# Patient Record
Sex: FEMALE | Race: OTHER | Employment: FULL TIME | URBAN - METROPOLITAN AREA
[De-identification: names, ages, dates, MRNs, and addresses within clinical notes are randomized per-mention and may not be internally consistent; named-entity substitution may affect disease eponyms.]

---

## 2017-02-12 ENCOUNTER — OFFICE VISIT (OUTPATIENT)
Dept: FAMILY MEDICINE CLINIC | Facility: CLINIC | Age: 37
End: 2017-02-12

## 2017-02-12 DIAGNOSIS — R05.9 COUGH: Primary | ICD-10-CM

## 2017-02-12 PROCEDURE — 99203 OFFICE O/P NEW LOW 30 MIN: CPT

## 2017-02-12 RX ORDER — GUAIFENESIN AND CODEINE PHOSPHATE 100; 10 MG/5ML; MG/5ML
SOLUTION ORAL
Qty: 180 ML | Refills: 0 | Status: SHIPPED | OUTPATIENT
Start: 2017-02-12 | End: 2017-04-13

## 2017-02-13 VITALS
SYSTOLIC BLOOD PRESSURE: 120 MMHG | TEMPERATURE: 98 F | DIASTOLIC BLOOD PRESSURE: 80 MMHG | HEART RATE: 94 BPM | RESPIRATION RATE: 20 BRPM | OXYGEN SATURATION: 98 %

## 2017-02-13 NOTE — PROGRESS NOTES
CHIEF COMPLAINT:   Patient presents with:  Cough/URI: for 3 days    HPI:   Lei Briones is a 29year old female who presents with upper respiratory symptoms for  3  days. Patient reports low grade fever, dry cough.       Current Outpatient Prescriptions: Polo Delacruz is a 29year old female who presents with upper respiratory symptoms that are consistent with    ASSESSMENT:   Cough  (primary encounter diagnosis)    PLAN: Meds as below.   Comfort care as described in Patient Instructions    Meds & Refills

## 2017-02-18 ENCOUNTER — OFFICE VISIT (OUTPATIENT)
Dept: FAMILY MEDICINE CLINIC | Facility: CLINIC | Age: 37
End: 2017-02-18

## 2017-02-18 VITALS
BODY MASS INDEX: 36.8 KG/M2 | SYSTOLIC BLOOD PRESSURE: 120 MMHG | HEART RATE: 92 BPM | OXYGEN SATURATION: 98 % | RESPIRATION RATE: 14 BRPM | WEIGHT: 200 LBS | TEMPERATURE: 98 F | DIASTOLIC BLOOD PRESSURE: 80 MMHG | HEIGHT: 62 IN

## 2017-02-18 DIAGNOSIS — J06.9 VIRAL URI WITH COUGH: Primary | ICD-10-CM

## 2017-02-18 DIAGNOSIS — B09 VIRAL RASH: ICD-10-CM

## 2017-02-18 PROCEDURE — 99213 OFFICE O/P EST LOW 20 MIN: CPT | Performed by: NURSE PRACTITIONER

## 2017-02-18 RX ORDER — BENZONATATE 200 MG/1
200 CAPSULE ORAL 3 TIMES DAILY PRN
Qty: 30 CAPSULE | Refills: 0 | Status: SHIPPED | OUTPATIENT
Start: 2017-02-18 | End: 2017-04-13

## 2017-02-18 NOTE — PROGRESS NOTES
CHIEF COMPLAINT:   Patient presents with:  Cough  Rash      HPI:   Harriet Phelan is a 29year old female who presents for uri symptoms for  9 days. Patient reports congestion, dry cough. Symptoms have been consistent since onset.   Seen here 6 days ago and GENERAL: well developed, well nourished,in no apparent distress  SKIN: no suspicious lesions. Golf ball size area on upper sternum with smooth maculopapular erythematous rash. Non-tender, not puritic, no drainage or warmth.   All other areas clear and wel · Salt water gargles (1 tsp. Salt in 6 oz lukewarm water), use several times daily to help decrease swelling and pain. · Use humidified air, steamy showers/baths and use vaporizer in sleeping quarters to keep secretions thin.     · Saline nasal spray to no · You may use acetaminophen or ibuprofen to control pain and fever, unless another medicine was prescribed.  (Note: If you have chronic liver or kidney disease, have ever had a stomach ulcer or gastrointestinal bleeding, or are taking blood-thinning medicin

## 2017-02-18 NOTE — PATIENT INSTRUCTIONS
· Hydrate! (cold or hot based on comfort). Drink lots of water or other non dehydrating liquids to help with illness. Salty foods, soups and tea can help with throat pain.    · Hand washing-use hand  or wash hands frequently, cover your cough o ibuprofen to control pain and fever, unless another medicine was prescribed.  (Note: If you have chronic liver or kidney disease, have ever had a stomach ulcer or gastrointestinal bleeding, or are taking blood-thinning medicines, talk with your healthcare p

## 2017-03-08 ENCOUNTER — OFFICE VISIT (OUTPATIENT)
Dept: FAMILY MEDICINE CLINIC | Facility: CLINIC | Age: 37
End: 2017-03-08

## 2017-03-08 VITALS
RESPIRATION RATE: 14 BRPM | OXYGEN SATURATION: 98 % | HEART RATE: 72 BPM | TEMPERATURE: 98 F | DIASTOLIC BLOOD PRESSURE: 80 MMHG | SYSTOLIC BLOOD PRESSURE: 120 MMHG

## 2017-03-08 DIAGNOSIS — R05.8 POST-VIRAL COUGH SYNDROME: Primary | ICD-10-CM

## 2017-03-08 PROCEDURE — 99213 OFFICE O/P EST LOW 20 MIN: CPT | Performed by: NURSE PRACTITIONER

## 2017-03-08 RX ORDER — PREDNISONE 20 MG/1
TABLET ORAL
Qty: 10 TABLET | Refills: 0 | Status: SHIPPED | OUTPATIENT
Start: 2017-03-08 | End: 2017-04-13

## 2017-03-09 NOTE — PROGRESS NOTES
CHIEF COMPLAINT:   Patient presents with:  Cough      HPI:   Gerhardt Boast is a 29year old female who presents with cough for  30 days. Started during a URI. Uri symptoms resolved but cough remains,    Patient reports cough Location is in middle throat.  Hunter Reece MUSCULOSKELETAL: patient denies any osseous, soft tissue, or joint complaints. NEURO: Denies headaches, numbness, or change in balane.     EXAM:   /80 mmHg  Pulse 72  Temp(Src) 98.2 °F (36.8 °C) (Oral)  Resp 14  SpO2 98%  LMP 01/25/2017  Breastfeedi Patient instructed to consider dextromethorphan per box instructions at bedtime for cough. Patient instructed to consider OTC guaifenesin per box instructions. Patient confident she is not allergic to guaifenesin.      Patient instructed to consider OTC A cough that is worse at night may be due to postnasal drip. Excess mucus in the nose drains from the back of your nose to your throat. This triggers the cough reflex. Postnasal drip may be due to a sinus infection or allergy.  Common allergens include dust The esophagus is the tube that carries food from the mouth to the stomach. A valve at its lower end prevents stomach acids from flowing upward. If this valve does not work properly, acid from the stomach enters the esophagus.  This may cause a burning pain Airway clearance techniques help to remove mucus from the airways. Clearing the airways helps to improve breathing and lessen the chance of infection. One method is controlled coughing.  Be sure to also use any medicines before or after clearing your airway

## 2017-03-09 NOTE — PATIENT INSTRUCTIONS
Cough, Chronic, Uncertain Cause (Adult)    Everyone has had a cough as part of the common cold, flu, or bronchitis. This kind of cough occurs along with an achy feeling, low-grade fever, nasal and sinus congestion, and a scratchy or sore throat.  This usu Let your healthcare provider know if you are taking any of these. Asthma  Cough may be the only sign of mild asthma. You may have tests to find out if asthma is causing your cough. You may also take asthma medicine on a trial basis.   Acid reflux (heartbur © 3846-0122 The [address] [place] [address], [address] [place]. All rights reserved. This information is not intended as a substitute for professional medical care. Always follow your healthcare professional's instructions.         Airway

## 2017-03-24 ENCOUNTER — HOSPITAL ENCOUNTER (OUTPATIENT)
Age: 37
Discharge: HOME | End: 2017-03-24
Attending: FAMILY MEDICINE
Payer: COMMERCIAL

## 2017-03-24 VITALS
DIASTOLIC BLOOD PRESSURE: 99 MMHG | HEIGHT: 62 IN | OXYGEN SATURATION: 98 % | TEMPERATURE: 98 F | HEART RATE: 114 BPM | WEIGHT: 200 LBS | RESPIRATION RATE: 18 BRPM | SYSTOLIC BLOOD PRESSURE: 141 MMHG | BODY MASS INDEX: 36.8 KG/M2

## 2017-03-24 DIAGNOSIS — J30.9 ALLERGIC RHINITIS, UNSPECIFIED ALLERGIC RHINITIS TRIGGER, UNSPECIFIED RHINITIS SEASONALITY: Primary | ICD-10-CM

## 2017-03-24 PROCEDURE — 99214 OFFICE O/P EST MOD 30 MIN: CPT

## 2017-03-24 PROCEDURE — 99213 OFFICE O/P EST LOW 20 MIN: CPT

## 2017-03-24 RX ORDER — PREDNISONE 20 MG/1
40 TABLET ORAL DAILY
Qty: 10 TABLET | Refills: 0 | Status: SHIPPED | OUTPATIENT
Start: 2017-03-24 | End: 2017-03-29

## 2017-03-24 RX ORDER — FLUTICASONE PROPIONATE 50 MCG
2 SPRAY, SUSPENSION (ML) NASAL DAILY
Qty: 16 G | Refills: 0 | Status: SHIPPED | OUTPATIENT
Start: 2017-03-24 | End: 2017-04-23

## 2017-03-24 NOTE — ED PROVIDER NOTES
Patient Seen in: [facility] In [address] [place] [address]    History   Patient presents with:  Cough/URI    Stated Complaint: Cough    HPI    Patient comes with a several week h/o nasal congestion and cough. She has not had any fevers.  She has been t Constitutional: She is oriented to person, place, and time. She appears well-developed and well-nourished. HENT:   Head: Normocephalic.    Right Ear: External ear normal.   Left Ear: External ear normal.   Mouth/Throat: Oropharynx is clear and moist.

## 2017-04-13 ENCOUNTER — OFFICE VISIT (OUTPATIENT)
Dept: FAMILY MEDICINE CLINIC | Facility: CLINIC | Age: 37
End: 2017-04-13

## 2017-04-13 VITALS
DIASTOLIC BLOOD PRESSURE: 60 MMHG | HEIGHT: 62 IN | BODY MASS INDEX: 36.99 KG/M2 | TEMPERATURE: 98 F | WEIGHT: 201 LBS | SYSTOLIC BLOOD PRESSURE: 110 MMHG | HEART RATE: 94 BPM

## 2017-04-13 DIAGNOSIS — R05.3 CHRONIC COUGH: Primary | ICD-10-CM

## 2017-04-13 DIAGNOSIS — J30.89 OTHER ALLERGIC RHINITIS: ICD-10-CM

## 2017-04-13 DIAGNOSIS — Z23 NEED FOR VACCINATION: ICD-10-CM

## 2017-04-13 PROCEDURE — 90715 TDAP VACCINE 7 YRS/> IM: CPT | Performed by: FAMILY MEDICINE

## 2017-04-13 PROCEDURE — 90471 IMMUNIZATION ADMIN: CPT | Performed by: FAMILY MEDICINE

## 2017-04-13 PROCEDURE — 99203 OFFICE O/P NEW LOW 30 MIN: CPT | Performed by: FAMILY MEDICINE

## 2017-04-13 PROCEDURE — 99212 OFFICE O/P EST SF 10 MIN: CPT | Performed by: FAMILY MEDICINE

## 2017-04-13 RX ORDER — MONTELUKAST SODIUM 10 MG/1
10 TABLET ORAL DAILY
Qty: 30 TABLET | Refills: 6 | Status: SHIPPED | OUTPATIENT
Start: 2017-04-13 | End: 2017-10-10

## 2017-04-13 RX ORDER — FLUTICASONE PROPIONATE 50 MCG
2 SPRAY, SUSPENSION (ML) NASAL DAILY
Qty: 1 BOTTLE | Refills: 6 | Status: SHIPPED | OUTPATIENT
Start: 2017-04-13 | End: 2017-08-11

## 2017-04-13 NOTE — PROGRESS NOTES
Patient ID: Polo Delacruz is a 29year old female. HPI  Patient presents with:  Cough: since middle of Jan     She has been coughing since the middle of January. The first time this started she was coughing for 2 weeks and lost her voice.   She did not Hfa [Albuter*    Unknown    Comment:Headache   PHYSICAL EXAM:   Physical Exam  Blood pressure 145/93, pulse 94, temperature 97.7 °F (36.5 °C), temperature source Oral, height 5' 2\" (1.575 m), weight 201 lb (91.173 kg), last menstrual period 04/04/2017, no rhinitis  -     Montelukast Sodium (SINGULAIR) 10 MG Oral Tab;  Take 1 tablet (10 mg total) by mouth daily.  -     Fluticasone Propionate 50 MCG/ACT Nasal Suspension; 2 sprays by Nasal route daily.  -     Allergy-Kulik    Need for vaccination  -     Odilon Banegas

## 2017-04-13 NOTE — PATIENT INSTRUCTIONS
I showed patient a brochure on [employer] SINUS RINSE and I want patient to do this periodically.   I told patient to make sure to use distilled water for this

## 2017-04-25 ENCOUNTER — NURSE ONLY (OUTPATIENT)
Dept: ALLERGY | Facility: CLINIC | Age: 37
End: 2017-04-25

## 2017-04-25 ENCOUNTER — OFFICE VISIT (OUTPATIENT)
Dept: ALLERGY | Facility: CLINIC | Age: 37
End: 2017-04-25

## 2017-04-25 VITALS
HEART RATE: 95 BPM | HEIGHT: 62 IN | WEIGHT: 202.19 LBS | BODY MASS INDEX: 37.21 KG/M2 | DIASTOLIC BLOOD PRESSURE: 82 MMHG | TEMPERATURE: 98 F | RESPIRATION RATE: 18 BRPM | OXYGEN SATURATION: 97 % | SYSTOLIC BLOOD PRESSURE: 128 MMHG

## 2017-04-25 DIAGNOSIS — J30.9 ALLERGIC RHINITIS, UNSPECIFIED ALLERGIC RHINITIS TRIGGER, UNSPECIFIED RHINITIS SEASONALITY: ICD-10-CM

## 2017-04-25 DIAGNOSIS — R05.3 CHRONIC COUGH: Primary | ICD-10-CM

## 2017-04-25 PROCEDURE — 95004 PERQ TESTS W/ALRGNC XTRCS: CPT | Performed by: ALLERGY & IMMUNOLOGY

## 2017-04-25 PROCEDURE — 95024 IQ TESTS W/ALLERGENIC XTRCS: CPT | Performed by: ALLERGY & IMMUNOLOGY

## 2017-04-25 PROCEDURE — 99244 OFF/OP CNSLTJ NEW/EST MOD 40: CPT | Performed by: ALLERGY & IMMUNOLOGY

## 2017-04-25 PROCEDURE — 94060 EVALUATION OF WHEEZING: CPT | Performed by: ALLERGY & IMMUNOLOGY

## 2017-04-25 PROCEDURE — 99212 OFFICE O/P EST SF 10 MIN: CPT | Performed by: ALLERGY & IMMUNOLOGY

## 2017-04-25 RX ORDER — LEVOCETIRIZINE DIHYDROCHLORIDE 5 MG/1
5 TABLET, FILM COATED ORAL NIGHTLY
Qty: 30 TABLET | Refills: 0 | Status: SHIPPED | OUTPATIENT
Start: 2017-04-25 | End: 2018-10-09

## 2017-04-25 NOTE — PROGRESS NOTES
Warden Mora is a 29year old female. HPI:   Patient presents with:  Cough    Patient is a 67-year-old female who presents for allergy consultation upon referral of their PCP, Dr. Lafaye Dubin.    prior visit with PCP reviewed and appreciated from April 13, Father    • Cancer Father      prostate   • Cancer Mother      cervical       Social History:   Smoking Status: Never Smoker                      Alcohol Use: No                 Medications (Active prior to today's visit):    Current Outpatient Prescriptio to auscultation bilaterally normal respiratory effort   Cardiovascular: regular rate and rhythm no murmurs, gallups, or rubs  Abdomen: soft non-tender non-distended  Skin/Hair: no unusual rashes present  Extremities: no edema, cyanosis, or clubbing  Neurol reviewed  Handouts on allergies and avoidance measures provided and reviewed including the potential treatment option of immunotherapy  Suspect her cough is more so from postnasal drip and from asthma or a primary lung issue    Follow-up in approximately 2

## 2017-05-30 ENCOUNTER — APPOINTMENT (OUTPATIENT)
Dept: CT IMAGING | Facility: HOSPITAL | Age: 37
End: 2017-05-30
Attending: EMERGENCY MEDICINE
Payer: COMMERCIAL

## 2017-05-30 ENCOUNTER — HOSPITAL ENCOUNTER (EMERGENCY)
Facility: HOSPITAL | Age: 37
Discharge: HOME | End: 2017-05-30
Attending: EMERGENCY MEDICINE
Payer: COMMERCIAL

## 2017-05-30 VITALS
WEIGHT: 200.63 LBS | HEART RATE: 90 BPM | OXYGEN SATURATION: 96 % | RESPIRATION RATE: 18 BRPM | SYSTOLIC BLOOD PRESSURE: 126 MMHG | HEIGHT: 62 IN | DIASTOLIC BLOOD PRESSURE: 77 MMHG | BODY MASS INDEX: 36.92 KG/M2

## 2017-05-30 DIAGNOSIS — K63.89 EPIPLOIC APPENDAGITIS: Primary | ICD-10-CM

## 2017-05-30 PROCEDURE — 74177 CT ABD & PELVIS W/CONTRAST: CPT | Performed by: EMERGENCY MEDICINE

## 2017-05-30 PROCEDURE — 96374 THER/PROPH/DIAG INJ IV PUSH: CPT

## 2017-05-30 PROCEDURE — 81025 URINE PREGNANCY TEST: CPT

## 2017-05-30 PROCEDURE — 85025 COMPLETE CBC W/AUTO DIFF WBC: CPT | Performed by: EMERGENCY MEDICINE

## 2017-05-30 PROCEDURE — 99284 EMERGENCY DEPT VISIT MOD MDM: CPT

## 2017-05-30 PROCEDURE — 80048 BASIC METABOLIC PNL TOTAL CA: CPT | Performed by: EMERGENCY MEDICINE

## 2017-05-30 PROCEDURE — 81001 URINALYSIS AUTO W/SCOPE: CPT | Performed by: EMERGENCY MEDICINE

## 2017-05-30 RX ORDER — KETOROLAC TROMETHAMINE 30 MG/ML
30 INJECTION, SOLUTION INTRAMUSCULAR; INTRAVENOUS ONCE
Status: COMPLETED | OUTPATIENT
Start: 2017-05-30 | End: 2017-05-30

## 2017-05-30 NOTE — ED NOTES
Assumed care of patient from triage. Patient to ED from home for right lower quadrant abdominal pain. Patient states that she started having RLQ pain yesterday but that it has not improved. Patient reports two episodes of diarrhea.  Patient states that pain

## 2017-05-30 NOTE — ED PROVIDER NOTES
Patient Seen in: [facility] AND [facility] Emergency Department    History   Patient presents with:  Abdomen/Flank Pain (GI/)    Stated Complaint: abd pain    HPI    The patient is a 28-year-old female who presents with 2 days of right lower quadrant abdominal are normal. Pupils are equal, round, and reactive to light. Neck: Normal range of motion. Neck supple. No JVD present. Cardiovascular: Normal rate, regular rhythm, normal heart sounds and intact distal pulses. No murmur heard.   Pulmonary/Chest: Effo LAVENDER TALL (BNP)       MDM     Pain improved after Toradol      Disposition and Plan     Clinical Impression:  Epiploic appendagitis  (primary encounter diagnosis)    Disposition:  Discharge    Follow-up:  Elvia Hernandez MD  [address]

## 2017-05-30 NOTE — ED INITIAL ASSESSMENT (HPI)
States she started to have rt sided ABD pain last night. No fever. No vomiting. No dysuria. No Vag bleed or discharge. Deniz Francois

## 2017-11-16 RX ORDER — MONTELUKAST SODIUM 10 MG/1
TABLET ORAL
Qty: 90 TABLET | Refills: 0 | Status: SHIPPED | OUTPATIENT
Start: 2017-11-16 | End: 2018-07-21

## 2017-11-16 NOTE — TELEPHONE ENCOUNTER
Chart reviewed. Refills sent per Triage Dept protocol.    Hx of asthma/allergies  Refill Protocol Appointment Criteria  · Appointment scheduled in the past 6 months or in the next 3 months  Recent Outpatient Visits            6 months ago Chronic cough    E

## 2018-12-01 ENCOUNTER — TELEPHONE (OUTPATIENT)
Dept: INTERNAL MEDICINE CLINIC | Facility: CLINIC | Age: 38
End: 2018-12-01

## 2018-12-01 NOTE — TELEPHONE ENCOUNTER
She called today complaing of rash under the breast , spoke with patient , most pasha is funcgal, I send topical cream , if not better  If worsening rash , call us

## 2019-12-31 ENCOUNTER — OFFICE VISIT (OUTPATIENT)
Dept: OBGYN CLINIC | Facility: CLINIC | Age: 39
End: 2019-12-31
Payer: COMMERCIAL

## 2019-12-31 VITALS — BODY MASS INDEX: 26 KG/M2 | SYSTOLIC BLOOD PRESSURE: 120 MMHG | WEIGHT: 144 LBS | DIASTOLIC BLOOD PRESSURE: 78 MMHG

## 2019-12-31 DIAGNOSIS — N97.9 FEMALE INFERTILITY: Primary | ICD-10-CM

## 2019-12-31 DIAGNOSIS — N91.4 SECONDARY OLIGOMENORRHEA: ICD-10-CM

## 2019-12-31 DIAGNOSIS — N92.6 IRREGULAR MENSES: ICD-10-CM

## 2019-12-31 PROCEDURE — 99203 OFFICE O/P NEW LOW 30 MIN: CPT | Performed by: OBSTETRICS & GYNECOLOGY

## 2019-12-31 NOTE — PROGRESS NOTES
HPI:    Patient ID: Naomi Cohn is a 46year old female.     HPI  New patient  Referred by Sourav Doyle  23-year-old  1 para 0-0-1-0 with a last menstrual period of  with a longstanding history of obesity and oligomenorrhea who recent Solution Pen-injector Inject 0.5 mg into the skin once a week. (Patient not taking: Reported on 12/31/2019 ) 1.5 mL 3   • METFORMIN  MG Oral Tab Take 1 tablet (500 mg total) by mouth 2 (two) times daily with meals.  (Patient not taking: Reported on 1

## 2020-01-02 ENCOUNTER — APPOINTMENT (OUTPATIENT)
Dept: LAB | Facility: HOSPITAL | Age: 40
End: 2020-01-02
Attending: OBSTETRICS & GYNECOLOGY
Payer: COMMERCIAL

## 2020-01-02 DIAGNOSIS — N97.9 FEMALE INFERTILITY: ICD-10-CM

## 2020-01-02 LAB
ESTRADIOL SERPL-MCNC: 22.9 PG/ML
FSH SERPL-ACNC: 7.3 MIU/ML

## 2020-01-02 PROCEDURE — 83001 ASSAY OF GONADOTROPIN (FSH): CPT

## 2020-01-02 PROCEDURE — 82670 ASSAY OF TOTAL ESTRADIOL: CPT

## 2020-01-02 PROCEDURE — 83520 IMMUNOASSAY QUANT NOS NONAB: CPT

## 2020-01-02 PROCEDURE — 36415 COLL VENOUS BLD VENIPUNCTURE: CPT

## 2020-01-03 LAB — ANTI-MULLERIAN HORMONE: 0.34 NG/ML

## 2020-01-09 ENCOUNTER — OFFICE VISIT (OUTPATIENT)
Dept: OBGYN CLINIC | Facility: CLINIC | Age: 40
End: 2020-01-09
Payer: COMMERCIAL

## 2020-01-09 VITALS — SYSTOLIC BLOOD PRESSURE: 112 MMHG | BODY MASS INDEX: 25 KG/M2 | DIASTOLIC BLOOD PRESSURE: 68 MMHG | WEIGHT: 139 LBS

## 2020-01-09 DIAGNOSIS — N97.9 FEMALE INFERTILITY: ICD-10-CM

## 2020-01-09 DIAGNOSIS — Z01.419 ENCOUNTER FOR WELL WOMAN EXAM WITH ROUTINE GYNECOLOGICAL EXAM: Primary | ICD-10-CM

## 2020-01-09 DIAGNOSIS — Z11.3 SCREENING FOR STD (SEXUALLY TRANSMITTED DISEASE): ICD-10-CM

## 2020-01-09 PROCEDURE — 99395 PREV VISIT EST AGE 18-39: CPT | Performed by: OBSTETRICS & GYNECOLOGY

## 2020-01-09 NOTE — PROGRESS NOTES
HPI:    Patient ID: Cody Bamberger is a 46year old year old female. 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   Patient had a longstanding lifelong history of amenorrhea and oligomenorrhea from anovulation related tablet 3   • Glucose Blood (CONTOUR NEXT TEST) In Vitro Strip E11.8 check glucose twice daily 100 strip 2   • Betamethasone Dipropionate Aug (DIPROLENE) 0.05 % External Ointment To area bid 45 g 1   • Fluticasone Furoate-Vilanterol (BREO ELLIPTA) 200-25 MC Pap test/HPV every 3 years when previous normal/-HPV. Monthly self breast exam.  First mammogram at age 37 unless family history or other. Contraception discussed. Recommend regular exercise and quality diet. Return to clinic in one year or as needed. (90 mg total) by mouth daily. , Disp: 30 tablet, Rfl: 3  Glucose Blood (CONTOUR NEXT TEST) In Vitro Strip, E11.8 check glucose twice daily, Disp: 100 strip, Rfl: 2  Betamethasone Dipropionate Aug (DIPROLENE) 0.05 % External Ointment, To area bid, Disp: 45 g

## 2020-01-10 LAB
C TRACH DNA SPEC QL NAA+PROBE: NEGATIVE
HPV I/H RISK 1 DNA SPEC QL NAA+PROBE: NEGATIVE
N GONORRHOEA DNA SPEC QL NAA+PROBE: NEGATIVE

## 2020-01-13 ENCOUNTER — TELEPHONE (OUTPATIENT)
Dept: OBGYN CLINIC | Facility: CLINIC | Age: 40
End: 2020-01-13

## 2020-01-13 NOTE — TELEPHONE ENCOUNTER
MyChart message sent to pt.    ----- Message from Janet Jimenes MD sent at 1/13/2020  1:35 PM CST -----  Please notify by MyChart or letter of normal Pap test and normal HPV cotesting.

## 2020-05-01 ENCOUNTER — TELEPHONE (OUTPATIENT)
Dept: OBGYN CLINIC | Facility: CLINIC | Age: 40
End: 2020-05-01

## 2020-05-01 DIAGNOSIS — N91.4 SECONDARY OLIGOMENORRHEA: ICD-10-CM

## 2020-05-01 DIAGNOSIS — N92.6 IRREGULAR MENSES: Primary | ICD-10-CM

## 2020-05-01 NOTE — TELEPHONE ENCOUNTER
Pt was under treatment taking hormones  for 3 mos took positive  Pregnancy test wants to know should she stop taking RX

## 2020-05-01 NOTE — TELEPHONE ENCOUNTER
Congratulations on her good news! I recommend:  Labs now:    - quant hcg  - serum progesterone  - TSH, total and free thyroxine and free T3  - Hgb A1C    Office appointment with me next week with early ob ultrasound. Discontinue meds:  Ozempic, Pantoprazole. Check with Dr. Cole Carter for alternatives if needed. Metformin could be considered.

## 2020-05-01 NOTE — TELEPHONE ENCOUNTER
Patient aware per AJB get her blood work done this week if possible, order placed in the system, marifer made on 5/5 for early ob 1 us.  Patient is unsure of her dates, told her based on her hcg levels the ob 1 us might be r/s for later date if its too early, patient understands no further questions at this time

## 2020-05-02 ENCOUNTER — APPOINTMENT (OUTPATIENT)
Dept: LAB | Facility: HOSPITAL | Age: 40
End: 2020-05-02
Attending: INTERNAL MEDICINE
Payer: COMMERCIAL

## 2020-05-02 DIAGNOSIS — E11.9 TYPE 2 DIABETES MELLITUS WITHOUT COMPLICATION, WITHOUT LONG-TERM CURRENT USE OF INSULIN (HCC): ICD-10-CM

## 2020-05-02 DIAGNOSIS — N92.6 IRREGULAR MENSES: ICD-10-CM

## 2020-05-02 DIAGNOSIS — N91.4 SECONDARY OLIGOMENORRHEA: ICD-10-CM

## 2020-05-02 PROCEDURE — 84144 ASSAY OF PROGESTERONE: CPT

## 2020-05-02 PROCEDURE — 84702 CHORIONIC GONADOTROPIN TEST: CPT

## 2020-05-02 PROCEDURE — 84439 ASSAY OF FREE THYROXINE: CPT

## 2020-05-02 PROCEDURE — 84481 FREE ASSAY (FT-3): CPT

## 2020-05-02 PROCEDURE — 83036 HEMOGLOBIN GLYCOSYLATED A1C: CPT

## 2020-05-02 PROCEDURE — 84443 ASSAY THYROID STIM HORMONE: CPT

## 2020-05-02 PROCEDURE — 36415 COLL VENOUS BLD VENIPUNCTURE: CPT

## 2020-05-04 ENCOUNTER — TELEPHONE (OUTPATIENT)
Dept: OBGYN CLINIC | Facility: CLINIC | Age: 40
End: 2020-05-04

## 2020-05-04 NOTE — TELEPHONE ENCOUNTER
Please inform patient that her blood work shows that she is early pregnant. Her quant bhcg level is 345. At this level we would not see anything on ultrasound.   I recommend that she repeat the quant bhcg and serum progesterone one week from the last one

## 2020-05-09 ENCOUNTER — HOSPITAL ENCOUNTER (EMERGENCY)
Facility: HOSPITAL | Age: 40
Discharge: HOME | End: 2020-05-09
Attending: EMERGENCY MEDICINE
Payer: COMMERCIAL

## 2020-05-09 ENCOUNTER — APPOINTMENT (OUTPATIENT)
Dept: ULTRASOUND IMAGING | Facility: HOSPITAL | Age: 40
End: 2020-05-09
Attending: EMERGENCY MEDICINE
Payer: COMMERCIAL

## 2020-05-09 VITALS
DIASTOLIC BLOOD PRESSURE: 89 MMHG | OXYGEN SATURATION: 98 % | RESPIRATION RATE: 19 BRPM | TEMPERATURE: 98 F | SYSTOLIC BLOOD PRESSURE: 126 MMHG | HEART RATE: 92 BPM

## 2020-05-09 DIAGNOSIS — O02.1 MISSED ABORTION: Primary | ICD-10-CM

## 2020-05-09 PROCEDURE — 76817 TRANSVAGINAL US OBSTETRIC: CPT | Performed by: EMERGENCY MEDICINE

## 2020-05-09 PROCEDURE — 86900 BLOOD TYPING SEROLOGIC ABO: CPT | Performed by: EMERGENCY MEDICINE

## 2020-05-09 PROCEDURE — 84702 CHORIONIC GONADOTROPIN TEST: CPT | Performed by: EMERGENCY MEDICINE

## 2020-05-09 PROCEDURE — 80048 BASIC METABOLIC PNL TOTAL CA: CPT | Performed by: EMERGENCY MEDICINE

## 2020-05-09 PROCEDURE — 85025 COMPLETE CBC W/AUTO DIFF WBC: CPT | Performed by: EMERGENCY MEDICINE

## 2020-05-09 PROCEDURE — 76801 OB US < 14 WKS SINGLE FETUS: CPT | Performed by: EMERGENCY MEDICINE

## 2020-05-09 PROCEDURE — 87086 URINE CULTURE/COLONY COUNT: CPT | Performed by: EMERGENCY MEDICINE

## 2020-05-09 PROCEDURE — 81025 URINE PREGNANCY TEST: CPT

## 2020-05-09 PROCEDURE — 86901 BLOOD TYPING SEROLOGIC RH(D): CPT | Performed by: EMERGENCY MEDICINE

## 2020-05-09 PROCEDURE — 36415 COLL VENOUS BLD VENIPUNCTURE: CPT

## 2020-05-09 PROCEDURE — 81001 URINALYSIS AUTO W/SCOPE: CPT | Performed by: EMERGENCY MEDICINE

## 2020-05-09 PROCEDURE — 99284 EMERGENCY DEPT VISIT MOD MDM: CPT

## 2020-05-09 NOTE — ED NOTES
Assumed care of patient from [facility]. Patient is resting comfortably in ED stretcher. Denies pain at this time. She is alert, oriented x4, speech clear, respirations regular and nonlabored. Able to dress self and ambulate unassisted with steady gait.

## 2020-05-09 NOTE — ED INITIAL ASSESSMENT (HPI)
Patient here with vaginal bleeding that began this morning. Patient is 4-6 weeks pregnant. Denies any vaginal pain.

## 2020-05-09 NOTE — PROGRESS NOTES
[facility]    Progress Note    Valeria Bosch Patient Status:  Emergency    1980 MRN [id number]   Location [address] Attending Krista Copeland MD   Hosp Day # 0 PCP Patsy Mendez MD        Subject 12/12/2019    T4F 0.8 05/02/2020    TSH 1.450 05/02/2020       Us Pregnancy Less Than 14 Weeks (transabdominal/transvaginal) (MNO=[id number])    Result Date: 5/9/2020  CONCLUSION:  1. No evidence of intrauterine gestation. 2. Normal appearance of adnexa.

## 2020-05-09 NOTE — ED NOTES
Patient off floor to [facility] [facility]

## 2020-05-09 NOTE — ED PROVIDER NOTES
Patient Seen in: [facility] AND [facility] Emergency Department      History   Patient presents with:  Pregnancy Issues    Stated Complaint: Eval G (6-8 weeks preg?)     HPI    The patient is a 72-year-old female who had a positive home pregnancy test and thinks Effort: Pulmonary effort is normal.      Breath sounds: Normal breath sounds. Abdominal:      General: Bowel sounds are normal. There is no distension. Palpations: Abdomen is soft. There is no mass. Tenderness: There is no tenderness.  There is Final result                 Please view results for these tests on the individual orders.    ABORH (BLOOD TYPE)   RAINBOW DRAW BLUE   RAINBOW DRAW LAVENDER   RAINBOW DRAW LIGHT GREEN   RAINBOW DRAW GOLD   URINE CULTURE, ROUTINE   CBC W/ DIFFERENT

## 2020-05-11 ENCOUNTER — TELEPHONE (OUTPATIENT)
Dept: OBGYN CLINIC | Facility: CLINIC | Age: 40
End: 2020-05-11

## 2020-05-11 NOTE — TELEPHONE ENCOUNTER
Patient has appt tomorrow for ob u/s 1 however she miscarried over the weekend 5/9 so visit should be changed to miscarriage f/u.

## 2020-05-12 ENCOUNTER — OFFICE VISIT (OUTPATIENT)
Dept: OBGYN CLINIC | Facility: CLINIC | Age: 40
End: 2020-05-12
Payer: COMMERCIAL

## 2020-05-12 VITALS — WEIGHT: 142 LBS | DIASTOLIC BLOOD PRESSURE: 70 MMHG | BODY MASS INDEX: 26 KG/M2 | SYSTOLIC BLOOD PRESSURE: 112 MMHG

## 2020-05-12 DIAGNOSIS — O03.9 MISCARRIAGE: Primary | ICD-10-CM

## 2020-05-12 PROCEDURE — 99213 OFFICE O/P EST LOW 20 MIN: CPT | Performed by: OBSTETRICS & GYNECOLOGY

## 2020-05-12 NOTE — PROGRESS NOTES
HPI:    Patient ID: Bianka Ríos is a 46year old female. HPI  Problem visit  Went to emergency room on May 9 with vaginal bleeding having passed moderate size clots. Quantitative beta hCG had dropped to 95.   Patient states that her bleeding sto TEST) In Vitro Strip E11.8 check glucose twice daily 100 strip 2     Allergies:  Cheratussin Ac [Gua*    INSOMNIA  Proair Hfa [Tizanid*    UNKNOWN    Comment:Headache   PHYSICAL EXAM:   Physical Exam  Abdomen soft, nontender. No masses. Pelvic deferred.

## 2020-05-16 ENCOUNTER — APPOINTMENT (OUTPATIENT)
Dept: LAB | Facility: HOSPITAL | Age: 40
End: 2020-05-16
Attending: OBSTETRICS & GYNECOLOGY
Payer: COMMERCIAL

## 2020-05-16 DIAGNOSIS — O03.9 MISCARRIAGE: ICD-10-CM

## 2020-05-16 PROCEDURE — 84702 CHORIONIC GONADOTROPIN TEST: CPT

## 2020-05-16 PROCEDURE — 36415 COLL VENOUS BLD VENIPUNCTURE: CPT

## 2020-05-18 ENCOUNTER — TELEPHONE (OUTPATIENT)
Dept: OBGYN CLINIC | Facility: CLINIC | Age: 40
End: 2020-05-18

## 2020-05-18 NOTE — TELEPHONE ENCOUNTER
Patient aware needs to get her quants doen every 3-4 days, marifer made in 1week to follow up with JAZZ

## 2020-05-18 NOTE — TELEPHONE ENCOUNTER
Spoke with patient and informed of quantitative beta-hCG level that jm. Will need to follow more closely, twice weekly. Please order serial quantitative beta-hCG x3. Patient to schedule office appointment for next week. Counseled on the possibility of ectopic pregnancy versus unresolved intrauterine miscarriage. Counseled on symptoms of ruptured ectopic pregnancy symptoms to go to the emergency room.

## 2020-05-19 ENCOUNTER — APPOINTMENT (OUTPATIENT)
Dept: LAB | Facility: HOSPITAL | Age: 40
End: 2020-05-19
Attending: OBSTETRICS & GYNECOLOGY
Payer: COMMERCIAL

## 2020-05-19 ENCOUNTER — TELEPHONE (OUTPATIENT)
Dept: OBGYN CLINIC | Facility: CLINIC | Age: 40
End: 2020-05-19

## 2020-05-19 DIAGNOSIS — N92.6 MISSED MENSES: Primary | ICD-10-CM

## 2020-05-19 DIAGNOSIS — O03.9 MISCARRIAGE: ICD-10-CM

## 2020-05-19 PROCEDURE — 84702 CHORIONIC GONADOTROPIN TEST: CPT

## 2020-05-19 PROCEDURE — 36415 COLL VENOUS BLD VENIPUNCTURE: CPT

## 2020-05-19 NOTE — TELEPHONE ENCOUNTER
US called back and they have scheduled pt for tomorrow at 12pm.     Pt needs to park at [address] and will need to make a left from [address] and go to [place]. Needs to drink about 24 oz 1hr before and not use restroom.  US informed to call Dr. Giovanna Guardado

## 2020-05-19 NOTE — TELEPHONE ENCOUNTER
I spoke with patient about her lab qbhcg showing an inadequately rising quantitative level. Counseled that it is abnormal and may be an ectopic pregnancy or intrauterine failed pregnancy. Patient does not have any pain or bleeding.   Has occasional left

## 2020-05-19 NOTE — TELEPHONE ENCOUNTER
LMTCB for US hold and call to call office back and help us schedule apt for pt tomorrow. Per AJB please provide his number for the hold and call as he would like to be notified of results and would like to speak to pt about results once performed.

## 2020-05-20 ENCOUNTER — HOSPITAL ENCOUNTER (OUTPATIENT)
Dept: ULTRASOUND IMAGING | Facility: HOSPITAL | Age: 40
Discharge: HOME | End: 2020-05-20
Attending: OBSTETRICS & GYNECOLOGY
Payer: COMMERCIAL

## 2020-05-20 ENCOUNTER — TELEPHONE (OUTPATIENT)
Dept: OBGYN CLINIC | Facility: CLINIC | Age: 40
End: 2020-05-20

## 2020-05-20 DIAGNOSIS — N92.6 MISSED MENSES: ICD-10-CM

## 2020-05-20 DIAGNOSIS — N92.6 MISSED MENSES: Primary | ICD-10-CM

## 2020-05-20 PROCEDURE — 76817 TRANSVAGINAL US OBSTETRIC: CPT | Performed by: OBSTETRICS & GYNECOLOGY

## 2020-05-20 PROCEDURE — 76801 OB US < 14 WKS SINGLE FETUS: CPT | Performed by: OBSTETRICS & GYNECOLOGY

## 2020-05-20 NOTE — TELEPHONE ENCOUNTER
I spoke to pt and informed of normal pelvic ultrasound result. Patient still feeling fine without pain or sx. Counseled on plan and wishes to continue expectancy with serial f/u of qbhcg 2x/wk. May spontaneously resolve.   If not, informed of poss MTX fo

## 2020-05-22 ENCOUNTER — TELEPHONE (OUTPATIENT)
Dept: OBGYN CLINIC | Facility: CLINIC | Age: 40
End: 2020-05-22

## 2020-05-22 ENCOUNTER — APPOINTMENT (OUTPATIENT)
Dept: LAB | Facility: HOSPITAL | Age: 40
End: 2020-05-22
Attending: OBSTETRICS & GYNECOLOGY
Payer: COMMERCIAL

## 2020-05-22 ENCOUNTER — HOSPITAL ENCOUNTER (EMERGENCY)
Facility: HOSPITAL | Age: 40
Discharge: HOME | End: 2020-05-22
Attending: EMERGENCY MEDICINE
Payer: COMMERCIAL

## 2020-05-22 VITALS
RESPIRATION RATE: 16 BRPM | HEIGHT: 62 IN | WEIGHT: 139.31 LBS | SYSTOLIC BLOOD PRESSURE: 133 MMHG | BODY MASS INDEX: 25.64 KG/M2 | TEMPERATURE: 99 F | DIASTOLIC BLOOD PRESSURE: 88 MMHG | HEART RATE: 80 BPM | OXYGEN SATURATION: 99 %

## 2020-05-22 DIAGNOSIS — N92.6 MISSED MENSES: ICD-10-CM

## 2020-05-22 DIAGNOSIS — O00.90 ECTOPIC PREGNANCY WITHOUT INTRAUTERINE PREGNANCY, UNSPECIFIED LOCATION: Primary | ICD-10-CM

## 2020-05-22 PROCEDURE — 96372 THER/PROPH/DIAG INJ SC/IM: CPT

## 2020-05-22 PROCEDURE — 99283 EMERGENCY DEPT VISIT LOW MDM: CPT

## 2020-05-22 PROCEDURE — 36415 COLL VENOUS BLD VENIPUNCTURE: CPT

## 2020-05-22 PROCEDURE — 84702 CHORIONIC GONADOTROPIN TEST: CPT

## 2020-05-22 NOTE — TELEPHONE ENCOUNTER
Spoke with patient to inform of abnormal rise of qbhcg indicating a nonviable pregnancy and likely ectopic pregnancy. Will Rx Methotrexate treatment. Counseled on risks and benefits and elects to proceed. Will be administered in ER.

## 2020-05-22 NOTE — TELEPHONE ENCOUNTER
ER notified of patient coming in for MTX Rx of ectopic pregnancy.   On call OB Dr. Ruddy Morin informed as well

## 2020-05-22 NOTE — ED INITIAL ASSESSMENT (HPI)
Pt to the ed sent to be given methotrexate d/t miscarriage on May 9th. Pt states Dr. Surekha Hartman called her in.

## 2020-05-23 NOTE — ED NOTES
Patient discharged home in no acute distress. She reports no physical complaints after Methotrexate injection. A&Ox4, skin p/w/d, denies cp/sob. Ambulating with steady gait and verbalized understanding of d/c instructions .

## 2020-05-23 NOTE — ED PROVIDER NOTES
Patient Seen in: [facility] AND [facility] Emergency Department      History   Patient presents with: Other    Stated Complaint: Patient needs Methotrexate for ectopic preg    HPI    45 yo female sent by OB for methotrexate.  She has had two ultrasounds without There is no tenderness. Skin:     General: Skin is warm and dry. Capillary Refill: Capillary refill takes less than 2 seconds. Neurological:      General: No focal deficit present. Mental Status: She is alert.       Sensory: No sensory deficit

## 2020-05-23 NOTE — ED NOTES
Received pt from triage. Pt here after having miscarriage on May 9th. Pt states she has been getting weekly Beta HcG test to watch levels and her levels have been going up.  Pt has last US on Wednesday that was normal. Pt denies any pain at this time, state

## 2020-05-26 ENCOUNTER — OFFICE VISIT (OUTPATIENT)
Dept: OBGYN CLINIC | Facility: CLINIC | Age: 40
End: 2020-05-26
Payer: COMMERCIAL

## 2020-05-26 ENCOUNTER — LAB ENCOUNTER (OUTPATIENT)
Dept: LAB | Facility: HOSPITAL | Age: 40
End: 2020-05-26
Attending: OBSTETRICS & GYNECOLOGY
Payer: COMMERCIAL

## 2020-05-26 VITALS — BODY MASS INDEX: 25 KG/M2 | WEIGHT: 139 LBS | DIASTOLIC BLOOD PRESSURE: 86 MMHG | SYSTOLIC BLOOD PRESSURE: 134 MMHG

## 2020-05-26 DIAGNOSIS — O00.90 ECTOPIC PREGNANCY WITHOUT INTRAUTERINE PREGNANCY, UNSPECIFIED LOCATION: Primary | ICD-10-CM

## 2020-05-26 PROCEDURE — 36415 COLL VENOUS BLD VENIPUNCTURE: CPT

## 2020-05-26 PROCEDURE — 99213 OFFICE O/P EST LOW 20 MIN: CPT | Performed by: OBSTETRICS & GYNECOLOGY

## 2020-05-26 PROCEDURE — 84702 CHORIONIC GONADOTROPIN TEST: CPT

## 2020-05-27 ENCOUNTER — TELEPHONE (OUTPATIENT)
Dept: OBGYN CLINIC | Facility: CLINIC | Age: 40
End: 2020-05-27

## 2020-05-27 PROBLEM — O00.90 ECTOPIC PREGNANCY WITHOUT INTRAUTERINE PREGNANCY: Status: ACTIVE | Noted: 2020-05-27

## 2020-05-27 NOTE — TELEPHONE ENCOUNTER
Patient being treated with Methotrexate for ectopic pregnancy. Please inform that quant bhcg is no longer rising, a good result. Should go for her Day 7 labs Friday.

## 2020-05-27 NOTE — PROGRESS NOTES
HPI:    Patient ID: Leatha Clay is a 46year old female. HPI  Problem visit  Patient was treated with methotrexate for suspected ectopic pregnancy. Had abnormal rise of quantitative beta-hCG.   No evidence for intrauterine or extrauterine pregna the skin once a week.  (Patient not taking: Reported on 5/26/2020 ) 1.5 mL 1   • Glucose Blood (CONTOUR NEXT TEST) In Vitro Strip E11.8 check glucose twice daily 100 strip 2   • Betamethasone Dipropionate Aug (DIPROLENE) 0.05 % External Ointment To area bid

## 2020-05-29 ENCOUNTER — TELEPHONE (OUTPATIENT)
Dept: OBGYN CLINIC | Facility: CLINIC | Age: 40
End: 2020-05-29

## 2020-05-29 ENCOUNTER — LAB ENCOUNTER (OUTPATIENT)
Dept: LAB | Facility: HOSPITAL | Age: 40
End: 2020-05-29
Attending: OBSTETRICS & GYNECOLOGY
Payer: COMMERCIAL

## 2020-05-29 DIAGNOSIS — Z87.59 HISTORY OF ECTOPIC PREGNANCY: Primary | ICD-10-CM

## 2020-05-29 DIAGNOSIS — N92.6 MISSED MENSES: ICD-10-CM

## 2020-05-29 DIAGNOSIS — O00.90 ECTOPIC PREGNANCY WITHOUT INTRAUTERINE PREGNANCY, UNSPECIFIED LOCATION: ICD-10-CM

## 2020-05-29 PROCEDURE — 85025 COMPLETE CBC W/AUTO DIFF WBC: CPT

## 2020-05-29 PROCEDURE — 36415 COLL VENOUS BLD VENIPUNCTURE: CPT

## 2020-05-29 PROCEDURE — 80053 COMPREHEN METABOLIC PANEL: CPT

## 2020-05-29 PROCEDURE — 84702 CHORIONIC GONADOTROPIN TEST: CPT

## 2020-05-29 NOTE — TELEPHONE ENCOUNTER
Edgard Navarrete from lab called with pt's Kami [facility] lab results. Glucose of 104 and AST lower than 8. Please advise.

## 2020-06-02 ENCOUNTER — TELEPHONE (OUTPATIENT)
Dept: OBGYN UNIT | Facility: HOSPITAL | Age: 40
End: 2020-06-02

## 2020-06-06 ENCOUNTER — APPOINTMENT (OUTPATIENT)
Dept: LAB | Facility: HOSPITAL | Age: 40
End: 2020-06-06
Attending: OBSTETRICS & GYNECOLOGY
Payer: COMMERCIAL

## 2020-06-06 DIAGNOSIS — Z87.59 HISTORY OF ECTOPIC PREGNANCY: ICD-10-CM

## 2020-06-06 PROCEDURE — 84702 CHORIONIC GONADOTROPIN TEST: CPT

## 2020-06-06 PROCEDURE — 36415 COLL VENOUS BLD VENIPUNCTURE: CPT

## 2020-06-13 ENCOUNTER — APPOINTMENT (OUTPATIENT)
Dept: LAB | Facility: HOSPITAL | Age: 40
End: 2020-06-13
Attending: OBSTETRICS & GYNECOLOGY
Payer: COMMERCIAL

## 2020-06-13 DIAGNOSIS — Z87.59 HISTORY OF ECTOPIC PREGNANCY: ICD-10-CM

## 2020-06-13 PROCEDURE — 36415 COLL VENOUS BLD VENIPUNCTURE: CPT

## 2020-06-13 PROCEDURE — 84702 CHORIONIC GONADOTROPIN TEST: CPT

## 2020-06-22 ENCOUNTER — TELEPHONE (OUTPATIENT)
Dept: OBGYN CLINIC | Facility: CLINIC | Age: 40
End: 2020-06-22

## 2020-06-22 ENCOUNTER — APPOINTMENT (OUTPATIENT)
Dept: LAB | Facility: HOSPITAL | Age: 40
End: 2020-06-22
Attending: OBSTETRICS & GYNECOLOGY
Payer: COMMERCIAL

## 2020-06-22 DIAGNOSIS — O03.9 MISCARRIAGE: Primary | ICD-10-CM

## 2020-06-22 DIAGNOSIS — Z87.59 HISTORY OF ECTOPIC PREGNANCY: ICD-10-CM

## 2020-06-22 PROCEDURE — 84702 CHORIONIC GONADOTROPIN TEST: CPT

## 2020-06-22 PROCEDURE — 36415 COLL VENOUS BLD VENIPUNCTURE: CPT

## 2020-06-22 NOTE — TELEPHONE ENCOUNTER
Informed pt of message and recommendations.    ----- Message from Katty Jravis MD sent at 6/22/2020 11:06 AM CDT -----  Please inform qcg is dropping well. Almost gone.   Repeat one week

## 2020-06-29 ENCOUNTER — APPOINTMENT (OUTPATIENT)
Dept: LAB | Facility: HOSPITAL | Age: 40
End: 2020-06-29
Attending: OBSTETRICS & GYNECOLOGY
Payer: COMMERCIAL

## 2020-06-29 DIAGNOSIS — O03.9 MISCARRIAGE: ICD-10-CM

## 2020-06-29 LAB — B-HCG SERPL-ACNC: 3 MIU/ML

## 2020-06-29 PROCEDURE — 84702 CHORIONIC GONADOTROPIN TEST: CPT

## 2020-06-29 PROCEDURE — 36415 COLL VENOUS BLD VENIPUNCTURE: CPT

## 2020-08-14 ENCOUNTER — MED REC SCAN ONLY (OUTPATIENT)
Dept: OBGYN CLINIC | Facility: CLINIC | Age: 40
End: 2020-08-14

## 2022-03-31 ENCOUNTER — HOSPITAL ENCOUNTER (OUTPATIENT)
Dept: MAMMOGRAPHY | Facility: HOSPITAL | Age: 42
Discharge: HOME | End: 2022-03-31
Attending: INTERNAL MEDICINE
Payer: COMMERCIAL

## 2022-03-31 DIAGNOSIS — Z12.31 VISIT FOR SCREENING MAMMOGRAM: ICD-10-CM

## 2022-03-31 DIAGNOSIS — Z00.00 WELL ADULT EXAM: ICD-10-CM

## 2022-03-31 PROCEDURE — 77063 BREAST TOMOSYNTHESIS BI: CPT | Performed by: INTERNAL MEDICINE

## 2022-03-31 PROCEDURE — 77067 SCR MAMMO BI INCL CAD: CPT | Performed by: INTERNAL MEDICINE

## (undated) NOTE — MR AVS SNAPSHOT
After Visit Summary   1/9/2020    Grant Simpson    MRN: [id number]           Visit Information     Date & Time  1/9/2020  3:40 PM Provider  Krystian Starkey, [facility], [address] [place], [place] [address].  Phone  [phone] THINPREP PAP SMEAR ONLY [[id number] CUSTOM]     Future Labs/Procedures Expected by Expires    CHLAMYDIA/GONOCOCCUS, JEANETTE [[id number] CUSTOM]  1/9/2020 (Approximate) 1/9/2021    HPV HIGH RISK , THIN PREP COLLECTION [[id number] CUSTOM]  1/9/2020 1/9/2021    THINPREP Average cost  $70*      [facility]  Monday – Friday  8:00 am – 8:00 pm   Saturday – Sunday  8:00 am – 4:00 pm    WALK-IN CARE  Primary Care Providers  Treatment for acute illness   or injury that are   non-life-threat

## (undated) NOTE — MR AVS SNAPSHOT
[facility] - [place] [address] [address] [id number] [phone]               Thank you for choosing us for your health care visit with Alexis Mtoa MD.  We are glad to serve you and happy to provide you with this summary o Sign up for [employer], your secure online medical record. "[employer]" will allow you to access patient instructions from your recent visit,  view other health information, and more. To sign up or find more information, go to [email or web address] and cl

## (undated) NOTE — ED AVS SNAPSHOT
[facility] AND [facility] in [facility].  [address] [facility]    Phone:  [phone]    Fax:  [phone]           Lei Briones   MRN: [id number]    Department:  [facility] AND [facility] in [address] [place] [address]   Date of Visit:  3 ALLERGIC RHINITIS (ENGLISH)      Disclosure     Insurance plans vary and the physician(s) referred by the Immediate Care may not be covered by your plan. It is possible that the physician may not participate in your health insurance plan.   This may resul IF THERE IS ANY CHANGE OR WORSENING OF YOUR CONDITION, CALL YOUR PRIMARY CARE PHYSICIAN AT ONCE OR GO TO THE EMERGENCY DEPARTMENT.     If you have been prescribed any medication(s), please fill your prescription right away and begin taking the medication(s) you to explore options for quitting.     - If you have concerns related to behavioral health issues or thoughts of harming yourself, contact [facility] at [phone].     - If you don’t have insurance, Don Blankenship

## (undated) NOTE — ED AVS SNAPSHOT
[facility] Emergency Department    [address]     [place] [address]    Phone:  [phone]    Fax:  [address]   MRN: [id number]    Department:  [facility] Emergency Department   Date of Visit:  5/30 and Class Registration line at [phone] or find a doctor online by visiting [email or web address].    IF THERE IS ANY CHANGE OR WORSENING OF YOUR CONDITION, CALL YOUR PRIMARY CARE PHYSICIAN AT ONCE OR RETURN IMMEDIATELY TO [address] [place] [address].     If

## (undated) NOTE — ED AVS SNAPSHOT
[facility] Emergency Department    [address] [address]    Phone:  [phone]    Fax:  [phone]   MRN: [id number]    Department:  [facility] Emergency Department   Date of Visit:  5/30 It is our goal to assure that you are completely satisfied with every aspect of your visit today.   In an effort to constantly improve our service to you, we would appreciate any positive or negative feedback related to the care you received in our emergenc [employer] account. You may have had testing done that requires us to contact you. Please make sure we have your correct phone number on file.       I certified that I have received a copy of the aftercare instructions; that these instructions have been expl [email or web address] will allow you to access patient instructions from your recent visit,  view other health information, and more. To sign up or find more information, go to [email or web address] and click on the Sign Up Now link in the [employer] box.      Enter

## (undated) NOTE — MR AVS SNAPSHOT
[address] [facility] [address]  [place] [address]  [phone]               Thank you for choosing us for your health care visit with Rosalinda Villalobos NP.   We are glad to serve you and happy to provide you with this summary of your for the common cold. This illness is contagious during the first few days. It is spread through the air by coughing and sneezing. It may also be spread by direct contact (touching the sick person and then touching your own eyes, nose, or mouth).  Frequent h · Difficulty swallowing due to throat pain  · Fever of 100.4°F (38°C)  Call 911, or get immediate medical care  Call emergency services right away if any of these occur:  · Chest pain, shortness of breath, wheezing, or difficulty breathing  · Coughing up b information, go to [email or web address] and click on the Sign Up Now link in the [employer] box. Enter your [employer] Activation Code exactly as it appears below along with your Zip Code and Date of Birth to complete the sign-up process.  If you do

## (undated) NOTE — MR AVS SNAPSHOT
[address] [facility] [address]  [place] [id number] [phone]               Thank you for choosing us for your health care visit with JOLENE Ding. We are glad to serve you and happy to provide you with this summary of your visit. Enter your [employer] Activation Code exactly as it appears below along with your Zip Code and Date of Birth to complete the sign-up process. If you do not sign up before the expiration date, you must request a new code.     Your unique [employer] Access Code: [email or web address] Visit [facility] online at  [email or web address]

## (undated) NOTE — MR AVS SNAPSHOT
[address]  [address] [facility]  [phone]               Thank you for choosing us for your health care visit with Gloria Fernandes DO.   We are glad to serve you and happy to provide you with this summary Assoc Dx:  Chronic cough [R05], Other allergic rhinitis [J30.89]          Reason for Today's Visit     Cough           Medical Issues Discussed Today     Chronic cough    -  Primary    Other allergic rhinitis          Instructions and Information about Yo - Montelukast Sodium 10 MG Tabs            [employer]     Sign up for [employer], your secure online medical record. [employer] will allow you to access patient instructions from your recent visit,  view other health information, and more.  To sign up or find more

## (undated) NOTE — MR AVS SNAPSHOT
[address] [facility] [address]  [place] [id number] [phone]               Thank you for choosing us for your health care visit with Padmaja Draper NP.   We are glad to serve you and happy to provide you with this summary of your vi secondhand smoke), environmental pollutants, pollen, mold, pets, cleaning agents, room deodorizers, and chemical fumes. Over-the-counter antihistamines or decongestants may be helpful for allergies. A sinus infection may requires antibiotic treatment.  See When to seek medical advice  Call your healthcare provider right away if any of these occur:  · Mild wheezing or difficulty breathing  · Fever of 100.4ºF (38ºC) or higher, or as directed by your healthcare provider  · Unexpected weight loss  · Coughing up © 4384-8661 [address], [address] [place] [place]. All rights reserved. This information is not intended as a substitute for professional medical care. Always follow your healthcare professional's instructions.              Al If you have questions, you can call [phone] to talk to our [facility] Staff. Remember, [employer] is NOT to be used for urgent needs. For medical emergencies, dial 911.            Visit [facility] online at  [email or web address]